# Patient Record
Sex: FEMALE | Race: WHITE
[De-identification: names, ages, dates, MRNs, and addresses within clinical notes are randomized per-mention and may not be internally consistent; named-entity substitution may affect disease eponyms.]

---

## 2020-12-26 ENCOUNTER — HOSPITAL ENCOUNTER (OUTPATIENT)
Dept: HOSPITAL 62 - RDC | Age: 20
End: 2020-12-26
Attending: NURSE PRACTITIONER
Payer: SELF-PAY

## 2020-12-26 VITALS — DIASTOLIC BLOOD PRESSURE: 81 MMHG | SYSTOLIC BLOOD PRESSURE: 134 MMHG

## 2020-12-26 DIAGNOSIS — U07.1: Primary | ICD-10-CM

## 2020-12-26 DIAGNOSIS — Z87.891: ICD-10-CM

## 2020-12-26 DIAGNOSIS — J34.89: ICD-10-CM

## 2020-12-26 DIAGNOSIS — R19.7: ICD-10-CM

## 2020-12-26 DIAGNOSIS — J06.9: ICD-10-CM

## 2020-12-26 DIAGNOSIS — R43.8: ICD-10-CM

## 2020-12-26 DIAGNOSIS — J02.9: ICD-10-CM

## 2020-12-26 LAB
A TYPE INFLUENZA AG: NEGATIVE
B INFLUENZA AG: NEGATIVE

## 2020-12-26 PROCEDURE — 87070 CULTURE OTHR SPECIMN AEROBIC: CPT

## 2020-12-26 PROCEDURE — 87880 STREP A ASSAY W/OPTIC: CPT

## 2020-12-26 PROCEDURE — 99201: CPT

## 2020-12-26 PROCEDURE — 87804 INFLUENZA ASSAY W/OPTIC: CPT

## 2020-12-26 PROCEDURE — C9803 HOPD COVID-19 SPEC COLLECT: HCPCS

## 2020-12-26 PROCEDURE — 87635 SARS-COV-2 COVID-19 AMP PRB: CPT

## 2020-12-26 NOTE — ER RDC ASSESSMENT REPORT
Intake





- In the Last 14 days


Have you traveled outside [place]?: No


Have you been in close contact with someone CONFIRMED: No


Worked in Healthcare?: No





- Symptoms


Subjective Fever(Felt feverish): No


Chills: No


Muscule Aches: No


Runny Nose: Yes


Sore Throat: Yes


Cough (New or worsening chronic cough): No


Shortness of breath: No


Nausea or Vomiting: No


Headache: No


Abdominal Pain: No


Diarrhea(3 or more loose stools in last 24 hours): Yes





- Do you have any of the following


Chronic lung disease: Asthma or emphysema or COPD: No


Cystic Fibrosis: No


Diabetes: No


High Blood Pressure: No


Cardiovascular Disease: No


Chronic Kidney Disease: No


Chronic Liver Disease: No


Chronic blood disorder like Sickle Cell Disease: No


Weak immune system due to disease or medication: No


Neurologic condition that limits movement: No


Developmental delay - Moderate to Severe: No


Recent (within past 2 weeks) or current Pregnancy: No


Morbid Obesity (>100 pounds over ideal weight): No





- Objective


Temperature: 98.6 F


Pulse Rate: 83


Respiratory Rate: 17


Blood Pressure: 134/81


O2 Sat by Pulse Oximetry: 97


Objective: 


Patient is a well-appearing 20-year-old female, who presents today for COVID-19 

screening.








Disposition: Home; Selfcare





General





- General


Stated Complaint: Upper respiratory symptoms


Mode of Arrival: Ambulatory


Information source: Patient


Notes: 


The patient was evaluated during the global COVID-19 pandemic. That diagnosis 

was suspected/considered upon initial presentation. Their evaluation, treatment,

and testing was consistent with current guidelines for patients who present with

complaints or symptoms that may be related to COVID-19.








- HPI


Patient complains to provider of: Upper respiratory symptoms


Onset: Other - 2 days


Onset/Duration: Constant, Persistent


Quality of pain: No pain


Severity: None


Pain Level: Denies


Associated symptoms: Diarrhea, Rhinnorhea, Sore throat, Other - Loss of taste 

and smell


Exacerbated by: Denies


Relieved by: Denies


Similar symptoms previously: No


Recently seen / treated by doctor: No





Past Medical History





- General


Information source: Patient





- Social History


Smoking Status: Former Smoker


Cigarette use (# per day): No - Patient reports she quit 1 year ago


Chew tobacco use (# tins/day): No


Smoking Education Provided: Yes


Frequency of alcohol use: Social


Drug Abuse: None


Occupation: 


Lives with: Family


Patient has suicidal ideation: No


Patient has homicidal ideation: No





Physical Exam





- General


General appearance: Appears well


In distress: None


Notes: 


PHYSICAL EXAMINATION: 


GENERAL: Well-appearing with No Acute Distress noted.  


HEAD: Atraumatic, Normocephalic. 


EYES: Sclera anicteric, Conjunctiva are pink and moist. 


ENT: Nares patent. Moist mucous membranes. 


NECK: Normal range of motion, supple without lymphadenopathy. 


LUNGS: CTAB and equal. No wheezes rales or rhonchi. 


HEART: Regular rate and rhythm without murmurs. 


ABDOMEN: Soft, nontender, normal bowel sounds, no guarding. 


EXTREMITIES: Normal range of motion, no pitting edema. No cyanosis. 


BACK: No midline or CVA tenderness. No step-off or deformity. 


NEUROLOGICAL: Cranial nerves grossly intact. Normal speech. Normal gait.


PSYCH: Calm, Cooperative, and answers questions appropriately. Normal mood and 

affect.


SKIN: Warm, Dry, Normal color and Turgor, No obvious lesions or rash noted.











Diagnostic Results


Laboratory Results: 


Patient notified of NEGATIVE results on Rapid Flu (A & B) and Rapid Strep. 

Advised Throat Culture and COVID testing are PENDING, and they will be notified 

of any POSITIVE culture results at a later date/time. Patient has been made 

aware that is currently taking 3 to 5 days for COVID test results, and that The 

[facility] will call to notify them of a POSITIVE COVID-19 

test results and a member of the [facility] team will call to 

notify them of a NEGATIVE COVID-19 result. Patient aware they will be notified 

by phone, whether they have a NEGATIVE or POSITIVE COVID-19 result.





Patient Education/Counseling


Counseling/Education: 


Patient presents with upper respiratory symptoms worrisome for possible COVID-

19.  Patient does not have symptoms worrisome as an emergency such as difficulty

breathing, shortness of breath, chest pain, pressure, confusion or cyanosis.  

Patient appears suitable for discharge.  Patient's vital signs are stable and 

patient is nontoxic in appearance.  Good return precautions have been discussed 

with patient, patient verbalized understanding and is agreeable with discharge 

plan of care at this time.





Patient provided COVID-19 discharge instructions to include:





As a person under investigation for COVID-19, the [place] department of 

Health and Human Services, division of public health advises you to adhere to 

the following guidance until your test results are reported to you.  If your 

test result is positive, you will receive additional information from your 

provider and your local health department at that time.


 


Remain at home until you are cleared by the health provider or public health 

authorities.


 


Keep a log of visitors to your home, notify any visitors to your home of your 

isolation status.


 


If you plan to move to a new address or leave the county, notify the local 

health department in your County.


 


Call your doctor or seek care if you have an urgent medical need.  Before 

seeking medical care, call ahead to get instructions from the provider before 

arriving at the medical office clinic or hospital.  Notify them that you are 

being tested for the virus that causes COVID-19 so that arrangements can be 

made, as necessary, to prevent transmission to others in the healthcare setting.

 Next, notify the local health department in your county.


 


If a medical emergency arises and you need to call 911, inform dispatch and the 

first responders that you are being tested for the virus that causes COVID-19.  

Next, notify the local health department in your county.





Patient provided education on smoking cessation and the harmful effects of 

smoking, especially in the presence of Co-morbid conditions such as 

Hypertension, Diabetes, and/or other chronic illnesses. Patient verbalized 

understanding of smoking cessation education, and the increased health benefits 

of quitting.





Guidance for worsening S/SX: 


For worsening symptoms, patient has been advised to contact their Primary Care 

Provider, or go to the nearest Emergency Department.








RDC Discharge





- Discharge


Clinical Impression: 


 COVID-19 Screening





URI (upper respiratory infection)


Qualifiers:


 URI type: unspecified URI Qualified Code(s): J06.9 - Acute upper respiratory 

infection, unspecified





Condition: Stable


Disposition: Home; Selfcare